# Patient Record
Sex: FEMALE | NOT HISPANIC OR LATINO | URBAN - METROPOLITAN AREA
[De-identification: names, ages, dates, MRNs, and addresses within clinical notes are randomized per-mention and may not be internally consistent; named-entity substitution may affect disease eponyms.]

---

## 2019-07-26 ENCOUNTER — IMPORTED ENCOUNTER (OUTPATIENT)
Dept: URBAN - METROPOLITAN AREA CLINIC 1 | Facility: CLINIC | Age: 49
End: 2019-07-26

## 2019-07-26 PROBLEM — H25.813: Noted: 2019-07-26

## 2019-07-26 PROBLEM — H16.141: Noted: 2019-07-26

## 2019-07-26 PROBLEM — H43.811: Noted: 2019-07-26

## 2019-07-26 PROCEDURE — 92004 COMPRE OPH EXAM NEW PT 1/>: CPT

## 2019-07-26 PROCEDURE — 92015 DETERMINE REFRACTIVE STATE: CPT

## 2019-07-26 NOTE — PATIENT DISCUSSION
1. 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 Recommend PF ATs Q2H OD frequently x 5 days. 2.  Cataract OU: Observe for now without intervention. The patient was advised to contact us if any change or worsening of vision3. PVD w/o Tear OD - Patient was cautioned to call our office immediately if they experience a substantial change in their symptoms such as an increase in floaters persistent flashes loss of visual field (may appear as a shadow or a curtain) or decrease in visual acuity as these may indicate a retinal tear or detachment. MRX for glasses given. Return for an appointment in 1 month 10/DFE OD only (PVD recheck) with Dr. Shad Ro.

## 2019-08-30 ENCOUNTER — IMPORTED ENCOUNTER (OUTPATIENT)
Dept: URBAN - METROPOLITAN AREA CLINIC 1 | Facility: CLINIC | Age: 49
End: 2019-08-30

## 2019-08-30 PROBLEM — H04.123: Noted: 2019-08-30

## 2019-08-30 PROBLEM — H43.811: Noted: 2019-08-30

## 2019-08-30 PROBLEM — H16.143: Noted: 2019-08-30

## 2019-08-30 PROBLEM — H25.813: Noted: 2019-08-30

## 2019-08-30 PROCEDURE — 92012 INTRM OPH EXAM EST PATIENT: CPT

## 2020-07-10 ENCOUNTER — IMPORTED ENCOUNTER (OUTPATIENT)
Dept: URBAN - METROPOLITAN AREA CLINIC 1 | Facility: CLINIC | Age: 50
End: 2020-07-10

## 2020-07-10 PROBLEM — H25.813: Noted: 2020-07-10

## 2020-07-10 PROBLEM — H04.123: Noted: 2020-07-10

## 2020-07-10 PROBLEM — H43.392: Noted: 2020-07-10

## 2020-07-10 PROBLEM — H43.811: Noted: 2020-07-10

## 2020-07-10 PROBLEM — H16.143: Noted: 2020-07-10

## 2020-07-10 PROCEDURE — 92015 DETERMINE REFRACTIVE STATE: CPT

## 2020-07-10 PROCEDURE — 92014 COMPRE OPH EXAM EST PT 1/>: CPT

## 2020-07-10 NOTE — PATIENT DISCUSSION
1.  Cataract OU: Observe for now without intervention. The patient was advised to contact us if any change or worsening of vision2. THELMA w/ PEK OU- Cont ATs TID OU Routinely. 3. PVD w/o Tear OD - RD precautions. 4.  Floater OS - Benign. Finalized glasses Mrx today. Return for an appointment in 1 year for a 30 (however patient may be moving) with Dr. Matheus Steven.

## 2020-08-14 ENCOUNTER — IMPORTED ENCOUNTER (OUTPATIENT)
Dept: URBAN - METROPOLITAN AREA CLINIC 1 | Facility: CLINIC | Age: 50
End: 2020-08-14

## 2020-08-14 NOTE — PATIENT DISCUSSION
Glasses check today. Manifest and glasses rechecked today. 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 Recommend separate pair of glasses for both distance and reading.

## 2021-01-28 ENCOUNTER — IMPORTED ENCOUNTER (OUTPATIENT)
Dept: URBAN - METROPOLITAN AREA CLINIC 1 | Facility: CLINIC | Age: 51
End: 2021-01-28

## 2021-01-28 PROBLEM — H16.212: Noted: 2021-01-28

## 2021-01-28 PROBLEM — G51.0: Noted: 2021-01-28

## 2021-01-28 PROCEDURE — 92012 INTRM OPH EXAM EST PATIENT: CPT

## 2021-01-28 NOTE — PATIENT DISCUSSION
1.  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 Recommend use Celluvisc Q30 mins OS and to patch left eye. Patient to f/u with Neurology as scheduled. 2.  THELMA w/ PEK OU -- Cont ATs TID OU Routinely. 3.  Cataract OU -- Observe for now without intervention. The patient was advised to contact us if any change or worsening of vision4. H/o PVD OD  Return for an appointment in 2 weeks for a 10 check k with Dr. Jennifer Lima.

## 2022-01-05 NOTE — PATIENT DISCUSSION
1.  PVD OD with no holes tears or detachments: Stable Reassurance. RD precautions. 2.  THELMA w/ PEK OU- Cont ATs TID OU Routinely. 3.  Cataract OU: Observe for now without intervention. The patient was advised to contact us if any change or worsening of visionReturn for an appointment in July 30 with Dr. Jl Thakur. no

## 2022-04-02 ASSESSMENT — VISUAL ACUITY
OD_GLARE: 20/50
OD_SC: J3
OS_CC: J1
OS_SC: 20/20
OD_CC: 20/50-1
OS_CC: 20/30-2
OS_GLARE: 20/50
OD_GLARE: 20/50
OS_CC: 20/30
OD_CC: J1
OD_CC: 20/40
OD_SC: 20/20
OD_CC: 20/40
OS_SC: J3
OS_CC: 20/60-1
OS_GLARE: 20/50
OD_SC: 20/20
OS_SC: 20/25

## 2022-04-02 ASSESSMENT — TONOMETRY
OS_IOP_MMHG: 16
OS_IOP_MMHG: 14
OS_IOP_MMHG: 16
OD_IOP_MMHG: 15
OS_IOP_MMHG: 13
OD_IOP_MMHG: 14
OD_IOP_MMHG: 14
OD_IOP_MMHG: 16